# Patient Record
Sex: MALE
[De-identification: names, ages, dates, MRNs, and addresses within clinical notes are randomized per-mention and may not be internally consistent; named-entity substitution may affect disease eponyms.]

---

## 2022-12-01 PROBLEM — Z00.00 ENCOUNTER FOR PREVENTIVE HEALTH EXAMINATION: Status: ACTIVE | Noted: 2022-12-01

## 2022-12-06 ENCOUNTER — APPOINTMENT (OUTPATIENT)
Dept: OTOLARYNGOLOGY | Facility: CLINIC | Age: 37
End: 2022-12-06
Payer: SELF-PAY

## 2022-12-06 VITALS
TEMPERATURE: 98.4 F | SYSTOLIC BLOOD PRESSURE: 145 MMHG | DIASTOLIC BLOOD PRESSURE: 92 MMHG | BODY MASS INDEX: 22.73 KG/M2 | HEIGHT: 68 IN | HEART RATE: 102 BPM | OXYGEN SATURATION: 98 % | WEIGHT: 150 LBS

## 2022-12-06 DIAGNOSIS — H93.11 TINNITUS, RIGHT EAR: ICD-10-CM

## 2022-12-06 PROCEDURE — 92557 COMPREHENSIVE HEARING TEST: CPT

## 2022-12-06 PROCEDURE — 92550 TYMPANOMETRY & REFLEX THRESH: CPT | Mod: 52

## 2022-12-06 PROCEDURE — 99203 OFFICE O/P NEW LOW 30 MIN: CPT

## 2022-12-06 NOTE — PHYSICAL EXAM
[Midline] : trachea located in midline position [Normal] : no neck adenopathy [de-identified] : mild r> l [de-identified] : minimal ar [de-identified] : gait steady

## 2022-12-06 NOTE — ASSESSMENT
[FreeTextEntry1] : r tinnitus - explained via  [id number] I cannot explain from his exam or  (denies bruxism); not symptomatic from AR\par offered trt or inc bckgd niose - he prefers to hod off\par mri ordered to r/o cpa lesion\par retrurn with mri \par brought to asst with  still on phone

## 2022-12-06 NOTE — HISTORY OF PRESENT ILLNESS
[de-identified] : Entire visit was performed using [employer] . 36 y/o M presenting with continuous "buzzing" sound in his right ear for the past 1 - 1.5 months. He denies any hearing changes. He denies dizziness. He came here 3 months ago from [place]. He thinks the change in climate might have affected tinnitus. He first noticed tinnitus approximately 1.5 months after he came here, insidious onset. He said he noticed it the day after he wore head phones. He is also c/o throat pain for the past month, which started 2 weeks after he came here. Throat pain started after he had a fever. It has since stopped. Denies loud noise exposure. Nonsmoker. No fh or sh relevant to cc. [facility]  ID: [id number]\par

## 2022-12-21 ENCOUNTER — APPOINTMENT (OUTPATIENT)
Dept: OTOLARYNGOLOGY | Facility: CLINIC | Age: 37
End: 2022-12-21